# Patient Record
Sex: MALE | Race: OTHER | NOT HISPANIC OR LATINO | URBAN - METROPOLITAN AREA
[De-identification: names, ages, dates, MRNs, and addresses within clinical notes are randomized per-mention and may not be internally consistent; named-entity substitution may affect disease eponyms.]

---

## 2022-11-07 ENCOUNTER — EMERGENCY (EMERGENCY)
Facility: HOSPITAL | Age: 37
LOS: 1 days | Discharge: HOME | End: 2022-11-07
Attending: EMERGENCY MEDICINE
Payer: COMMERCIAL

## 2022-11-07 VITALS
WEIGHT: 300.05 LBS | HEART RATE: 85 BPM | SYSTOLIC BLOOD PRESSURE: 143 MMHG | HEIGHT: 75 IN | TEMPERATURE: 99 F | OXYGEN SATURATION: 97 % | RESPIRATION RATE: 18 BRPM | DIASTOLIC BLOOD PRESSURE: 83 MMHG

## 2022-11-07 VITALS
OXYGEN SATURATION: 100 % | RESPIRATION RATE: 18 BRPM | SYSTOLIC BLOOD PRESSURE: 134 MMHG | TEMPERATURE: 98 F | HEART RATE: 69 BPM | DIASTOLIC BLOOD PRESSURE: 80 MMHG

## 2022-11-07 LAB
ALBUMIN SERPL ELPH-MCNC: 3.9 G/DL — SIGNIFICANT CHANGE UP (ref 3.3–5.2)
ALP SERPL-CCNC: 66 U/L — SIGNIFICANT CHANGE UP (ref 40–120)
ALT FLD-CCNC: 16 U/L — SIGNIFICANT CHANGE UP
ANION GAP SERPL CALC-SCNC: 11 MMOL/L — SIGNIFICANT CHANGE UP (ref 5–17)
AST SERPL-CCNC: 25 U/L — SIGNIFICANT CHANGE UP
BASOPHILS # BLD AUTO: 0.05 K/UL — SIGNIFICANT CHANGE UP (ref 0–0.2)
BASOPHILS NFR BLD AUTO: 0.6 % — SIGNIFICANT CHANGE UP (ref 0–2)
BILIRUB SERPL-MCNC: 0.3 MG/DL — LOW (ref 0.4–2)
BUN SERPL-MCNC: 21.9 MG/DL — HIGH (ref 8–20)
CALCIUM SERPL-MCNC: 8.6 MG/DL — SIGNIFICANT CHANGE UP (ref 8.4–10.5)
CHLORIDE SERPL-SCNC: 103 MMOL/L — SIGNIFICANT CHANGE UP (ref 96–108)
CO2 SERPL-SCNC: 21 MMOL/L — LOW (ref 22–29)
CREAT SERPL-MCNC: 1.21 MG/DL — SIGNIFICANT CHANGE UP (ref 0.5–1.3)
D DIMER BLD IA.RAPID-MCNC: <150 NG/ML DDU — SIGNIFICANT CHANGE UP
EGFR: 79 ML/MIN/1.73M2 — SIGNIFICANT CHANGE UP
EOSINOPHIL # BLD AUTO: 0.44 K/UL — SIGNIFICANT CHANGE UP (ref 0–0.5)
EOSINOPHIL NFR BLD AUTO: 5.1 % — SIGNIFICANT CHANGE UP (ref 0–6)
GLUCOSE SERPL-MCNC: 99 MG/DL — SIGNIFICANT CHANGE UP (ref 70–99)
HCT VFR BLD CALC: 42.6 % — SIGNIFICANT CHANGE UP (ref 39–50)
HGB BLD-MCNC: 14.6 G/DL — SIGNIFICANT CHANGE UP (ref 13–17)
IMM GRANULOCYTES NFR BLD AUTO: 0.3 % — SIGNIFICANT CHANGE UP (ref 0–0.9)
LYMPHOCYTES # BLD AUTO: 2.35 K/UL — SIGNIFICANT CHANGE UP (ref 1–3.3)
LYMPHOCYTES # BLD AUTO: 27.1 % — SIGNIFICANT CHANGE UP (ref 13–44)
MCHC RBC-ENTMCNC: 30.2 PG — SIGNIFICANT CHANGE UP (ref 27–34)
MCHC RBC-ENTMCNC: 34.3 GM/DL — SIGNIFICANT CHANGE UP (ref 32–36)
MCV RBC AUTO: 88.2 FL — SIGNIFICANT CHANGE UP (ref 80–100)
MONOCYTES # BLD AUTO: 0.76 K/UL — SIGNIFICANT CHANGE UP (ref 0–0.9)
MONOCYTES NFR BLD AUTO: 8.8 % — SIGNIFICANT CHANGE UP (ref 2–14)
NEUTROPHILS # BLD AUTO: 5.04 K/UL — SIGNIFICANT CHANGE UP (ref 1.8–7.4)
NEUTROPHILS NFR BLD AUTO: 58.1 % — SIGNIFICANT CHANGE UP (ref 43–77)
NT-PROBNP SERPL-SCNC: <5 PG/ML — SIGNIFICANT CHANGE UP (ref 0–300)
PLATELET # BLD AUTO: 256 K/UL — SIGNIFICANT CHANGE UP (ref 150–400)
POTASSIUM SERPL-MCNC: 4.4 MMOL/L — SIGNIFICANT CHANGE UP (ref 3.5–5.3)
POTASSIUM SERPL-SCNC: 4.4 MMOL/L — SIGNIFICANT CHANGE UP (ref 3.5–5.3)
PROT SERPL-MCNC: 7.5 G/DL — SIGNIFICANT CHANGE UP (ref 6.6–8.7)
RAPID RVP RESULT: SIGNIFICANT CHANGE UP
RBC # BLD: 4.83 M/UL — SIGNIFICANT CHANGE UP (ref 4.2–5.8)
RBC # FLD: 12.2 % — SIGNIFICANT CHANGE UP (ref 10.3–14.5)
SARS-COV-2 RNA SPEC QL NAA+PROBE: SIGNIFICANT CHANGE UP
SODIUM SERPL-SCNC: 135 MMOL/L — SIGNIFICANT CHANGE UP (ref 135–145)
TROPONIN T SERPL-MCNC: <0.01 NG/ML — SIGNIFICANT CHANGE UP (ref 0–0.06)
WBC # BLD: 8.67 K/UL — SIGNIFICANT CHANGE UP (ref 3.8–10.5)
WBC # FLD AUTO: 8.67 K/UL — SIGNIFICANT CHANGE UP (ref 3.8–10.5)

## 2022-11-07 PROCEDURE — 93010 ELECTROCARDIOGRAM REPORT: CPT

## 2022-11-07 PROCEDURE — 71046 X-RAY EXAM CHEST 2 VIEWS: CPT | Mod: 26

## 2022-11-07 PROCEDURE — 85379 FIBRIN DEGRADATION QUANT: CPT

## 2022-11-07 PROCEDURE — 96361 HYDRATE IV INFUSION ADD-ON: CPT

## 2022-11-07 PROCEDURE — 99284 EMERGENCY DEPT VISIT MOD MDM: CPT | Mod: 25

## 2022-11-07 PROCEDURE — 85025 COMPLETE CBC W/AUTO DIFF WBC: CPT

## 2022-11-07 PROCEDURE — 94640 AIRWAY INHALATION TREATMENT: CPT

## 2022-11-07 PROCEDURE — 99285 EMERGENCY DEPT VISIT HI MDM: CPT

## 2022-11-07 PROCEDURE — 83880 ASSAY OF NATRIURETIC PEPTIDE: CPT

## 2022-11-07 PROCEDURE — 80053 COMPREHEN METABOLIC PANEL: CPT

## 2022-11-07 PROCEDURE — 0225U NFCT DS DNA&RNA 21 SARSCOV2: CPT

## 2022-11-07 PROCEDURE — 84484 ASSAY OF TROPONIN QUANT: CPT

## 2022-11-07 PROCEDURE — 36415 COLL VENOUS BLD VENIPUNCTURE: CPT

## 2022-11-07 PROCEDURE — 71046 X-RAY EXAM CHEST 2 VIEWS: CPT

## 2022-11-07 PROCEDURE — 96374 THER/PROPH/DIAG INJ IV PUSH: CPT

## 2022-11-07 PROCEDURE — 93005 ELECTROCARDIOGRAM TRACING: CPT

## 2022-11-07 RX ORDER — SODIUM CHLORIDE 9 MG/ML
1000 INJECTION INTRAMUSCULAR; INTRAVENOUS; SUBCUTANEOUS ONCE
Refills: 0 | Status: COMPLETED | OUTPATIENT
Start: 2022-11-07 | End: 2022-11-07

## 2022-11-07 RX ORDER — DEXAMETHASONE 0.5 MG/5ML
10 ELIXIR ORAL ONCE
Refills: 0 | Status: COMPLETED | OUTPATIENT
Start: 2022-11-07 | End: 2022-11-07

## 2022-11-07 RX ORDER — ALBUTEROL 90 UG/1
2 AEROSOL, METERED ORAL EVERY 6 HOURS
Refills: 0 | Status: DISCONTINUED | OUTPATIENT
Start: 2022-11-07 | End: 2022-11-09

## 2022-11-07 RX ADMIN — ALBUTEROL 2 PUFF(S): 90 AEROSOL, METERED ORAL at 11:01

## 2022-11-07 RX ADMIN — Medication 102 MILLIGRAM(S): at 11:01

## 2022-11-07 RX ADMIN — SODIUM CHLORIDE 1000 MILLILITER(S): 9 INJECTION INTRAMUSCULAR; INTRAVENOUS; SUBCUTANEOUS at 08:59

## 2022-11-07 RX ADMIN — SODIUM CHLORIDE 1000 MILLILITER(S): 9 INJECTION INTRAMUSCULAR; INTRAVENOUS; SUBCUTANEOUS at 11:01

## 2022-11-07 NOTE — ED PROVIDER NOTE - NS ED ROS FT
Gen: No fever, normal PO intake, no weight changes  Eyes: No eye irritation or discharge  ENT: No ear pain, no congestion, no rhinorrhea, no sore throat  Resp: No cough, +mild difficulty breathing  Cardiovascular: No chest pain, +palpitations  Gastrointestinal: No nausea, no vomiting, +diarrhea, no constipation  :  No change in urine output; no dysuria, no hematuria  MS: No joint or muscle pain  Skin: No rashes  Neuro: No headache; no abnormal movements  Remainder negative, except as per the HPI

## 2022-11-07 NOTE — ED PROVIDER NOTE - OBJECTIVE STATEMENT
36 year old male with PMHx HLD presenting for evaluation of palpitations. Reports 2-3 one minute episodes of palpitations at rest for past 1 week, self resolving. States became concerned this morning as palpitations have not resolved, started at 7:30AM, feel stronger than his past episodes, described as a feeling of his "heart about to jump out of his chest". Reports daily night sweats for past 1 week. Notes about 2 weeks of URI symptoms earlier this month, has been asymptomatic for 1 week. +1 episode of nonbloody diarrhea this morning, otherwise normal PO intake with normal BMs. Also reports a 5 hour car ride [place] [employer] 1 week ago. Denies any dyspnea during past 1 week but during examination patient does endorse feeling some shortness of breath. Denies any leg pain, pain in chest, numbness, weakness, headache, fever, cough, weight loss, abdominal pain.   Admits to smoking cigar about once per week, vaping (nicotine) daily, denies illicit drug use.

## 2022-11-07 NOTE — ED PROVIDER NOTE - NSFOLLOWUPINSTRUCTIONS_ED_ALL_ED_FT
Acute Bronchitis, Adult    A comparison between normal and swollen airways, or bronchi, in the lungs.   Acute bronchitis is sudden inflammation of the main airways (bronchi) that come off the windpipe (trachea) in the lungs. The swelling causes the airways to get smaller and make more mucus than normal. This can make it hard to breathe and can cause coughing or noisy breathing (wheezing).    Acute bronchitis may last several weeks. The cough may last longer. Allergies, asthma, and exposure to smoke may make the condition worse.      What are the causes?    This condition can be caused by germs and by substances that irritate the lungs, including:  •Cold and flu viruses. The most common cause of this condition is the virus that causes the common cold.      •Bacteria. This is less common.    •Breathing in substances that irritate the lungs, including:  •Smoke from cigarettes and other forms of tobacco.      •Dust and pollen.      •Fumes from household cleaning products, gases, or burned fuel.      •Indoor or outdoor air pollution.          What increases the risk?    The following factors may make you more likely to develop this condition:  •A weak body's defense system, also called the immune system.    •A condition that affects your lungs and breathing, such as asthma.      What are the signs or symptoms?    Common symptoms of this condition include:  •Coughing. This may bring up clear, yellow, or green mucus from your lungs (sputum).      •Wheezing.      •Runny or stuffy nose.      •Having too much mucus in your lungs (chest congestion).      •Shortness of breath.      •Aches and pains, including sore throat or chest.        How is this diagnosed?    This condition is usually diagnosed based on:  •Your symptoms and medical history.      •A physical exam.      You may also have other tests, including tests to rule out other conditions, such as pneumonia. These tests include:  •A test of lung function.      •Test of a mucus sample to look for the presence of bacteria.      •Tests to check the oxygen level in your blood.      •Blood tests.      •Chest X-ray.        How is this treated?    Most cases of acute bronchitis clear up over time without treatment. Your health care provider may recommend:  •Drinking more fluids to help thin your mucus so it is easier to cough up.      •Taking inhaled medicine (inhaler) to improve air flow in and out of your lungs.      •Using a vaporizer or a humidifier. These are machines that add water to the air to help you breathe better.      •Taking a medicine that thins mucus and clears congestion (expectorant).      •Taking a medicine that prevents or stops coughing (cough suppressant).      It is notcommon to take an antibiotic medicine for this condition.      Follow these instructions at home:  A do not smoke cigarettes sign.   •Take over-the-counter and prescription medicines only as told by your health care provider.      •Use an inhaler, vaporizer, or humidifier as told by your health care provider.      •Take two teaspoons (10 mL) of honey at bedtime to lessen coughing at night.      •Drink enough fluid to keep your urine pale yellow.      • Do not use any products that contain nicotine or tobacco. These products include cigarettes, chewing tobacco, and vaping devices, such as e-cigarettes. If you need help quitting, ask your health care provider.      •Get plenty of rest.      •Return to your normal activities as told by your health care provider. Ask your health care provider what activities are safe for you.      •Keep all follow-up visits. This is important.        How is this prevented?  Washing hands with soap and water.   To lower your risk of getting this condition again:  •Wash your hands often with soap and water for at least 20 seconds. If soap and water are not available, use hand .    •Avoid contact with people who have cold symptoms.    •Try not to touch your mouth, nose, or eyes with your hands.    •Avoid breathing in smoke or chemical fumes. Breathing smoke or chemical fumes will make your condition worse.      •Get the flu shot every year.        Contact a health care provider if:    •Your symptoms do not improve after 2 weeks.    •You have trouble coughing up the mucus.    •Your cough keeps you awake at night.    •You have a fever.        Get help right away if you:    •Cough up blood.    •Feel pain in your chest.    •Have severe shortness of breath.    •Faint or keep feeling like you are going to faint.      •Have a severe headache.      •Have a fever or chills that get worse.      These symptoms may represent a serious problem that is an emergency. Do not wait to see if the symptoms will go away. Get medical help right away. Call your local emergency services (911 in the [place] Do not drive yourself to the hospital.       Summary    •Acute bronchitis is inflammation of the main airways (bronchi) that come off the windpipe (trachea) in the lungs. The swelling causes the airways to get smaller and make more mucus than normal.    •Drinking more fluids can help thin your mucus so it is easier to cough up.    •Take over-the-counter and prescription medicines only as told by your health care provider.    • Do not use any products that contain nicotine or tobacco. These products include cigarettes, chewing tobacco, and vaping devices, such as e-cigarettes. If you need help quitting, ask your health care provider.      •Contact a health care provider if your symptoms do not improve after 2 weeks.

## 2022-11-07 NOTE — ED PROVIDER NOTE - NSFOLLOWUPCLINICS_GEN_ALL_ED_FT
A Cardiologist  Cardiology  .  [place]   Phone:   Fax:     [facility]  Cardiology  [address] [place] [address]  Phone: [phone]  Fax:     [facility]  Cardiology  [address] [facility] [address] [facility] [address] [facility], [facility] [address]  [place] [address]  Phone: [phone]  Fax:

## 2022-11-07 NOTE — ED ADULT NURSE NOTE - OBJECTIVE STATEMENT
Patient presents to ER C/O palpitations for the past week, denies CP, no SOB, no sick contacts/recent travel, resp even/unlabored.

## 2022-11-07 NOTE — ED PROVIDER NOTE - ATTENDING CONTRIBUTION TO CARE
negative... Yuri PRATT- 35 Y/O M p/w palpitations off& on for 1 week but no chest pain, syncope, nausea, vomiting. Pt is not smoker and had recent uri and took mucinex until last week, pt has mild residual cough. No fhx of cad , no h/o asthma    Pt is alert, well appearing male, s1s2 normal reg, b/l clear breath sounds, abd soft, nt, nd, neuro exam aox3, cn 2-12 intact, no focal deficits, skin warm, dry, good turgor    plan to check labs r/o acs,  electrolyte imbalance and if all neg , plan for outpatient f/u    cxr showed  bronchitis changes, will treat with decadron, albuterol and discharge home

## 2022-11-07 NOTE — ED PROVIDER NOTE - PHYSICAL EXAMINATION
Gen: well appearing, no acute distress  Head: normocephalic, atraumatic  EENT: EOMI, PERRLA, neck supple, moist mucous membranes, no scleral icterus  Lung: no increased work of breathing, clear to auscultation bilaterally, no wheezing, rales, rhonchi, speaking in full sentences  CV: regular rate, regular rhythm, normal s1/s2, 2+ radial pulses bilaterally  Abd: soft, non-tender, non-distended, no rebound tenderness or guarding; no CVA tenderness  MSK: No edema, no visible deformities, full range of motion in all 4 extremities  Neuro: Awake, alert, no focal neurologic deficits  Skin: No rash, no lesions, no jaundice  Psych: normal affect, normal speech

## 2022-11-07 NOTE — ED ADULT TRIAGE NOTE - CHIEF COMPLAINT QUOTE
c/o chest pain that started intermittently last week and today became constant and heavy pressure with palpitation also c/o night sweats

## 2022-11-07 NOTE — ED PROVIDER NOTE - CLINICAL SUMMARY MEDICAL DECISION MAKING FREE TEXT BOX
36 year old male with 1 week of palpitations and night sweats. Risk factors of nicotine use, recent long car ride, recent URI. EKG done at triage normal sinus rhythm at 79bpm. Will check blood work, give IVF, reassess.

## 2022-11-07 NOTE — ED PROVIDER NOTE - PROGRESS NOTE DETAILS
Cardiac work up, d-dimer, basic labs reviewed, unremarkable, do not require emergent intervention. Chest x-ray without focal consolidation, notable for peribronchial cuffing, consistent with bronchitis. Treated with albuterol and decadron with improvement of symptoms.     Reviewed negative work up results with patient. Patient reports feeling much improved. Comfortable with plan for discharge. Agrees to follow up with primary care provider/cardiology. Return instructions given, questions answered.

## 2023-04-24 NOTE — ED PROVIDER NOTE - NSTIMEPROVIDERCAREINITIATE_GEN_ER
Weight Units: pounds Pounds Preamble Statement (Weight Entered In Details Tab): Reported Weight in pounds: Hypertriglyceridemia Monitoring: I explained this is common when taking isotretinoin. If this worsens they will contact us. Cheilitis Aggressive Treatment: I recommended application of Vaseline or Aquaphor numerous times a day (as often as every hour) and before going to bed. I also prescribed a topical steroid for twice daily use. Hypertriglyceridemia Treatment: I explained this is common when taking isotretinoin. If this worsens they will contact us. They may try OTC ibuprofen. 07-Nov-2022 08:18 Xerosis Normal Treatment: I recommended application of Cetaphil or CeraVe numerous times a day and before going to bed to all dry areas. Any Myalgia?: No Next Month's Dosage: Continue Current Dosage Kilograms Preamble Statement (Weight Entered In Details Tab): Reported Weight in kilograms: Headache Monitoring: I recommended monitoring the headaches for now. There is no evidence of increased intracranial pressure. They were instructed to call if the headaches are worsening. Retinoid Dermatitis Normal Treatment: I recommended more frequent application of Cetaphil or CeraVe to the areas of dermatitis. Months Of Therapy Completed: 1 Xerosis Aggressive Treatment: I recommended application of Cetaphil or CeraVe numerous times a day and before going to bed to all dry areas. I also prescribed a topical steroid for twice daily use. Lower Range (In Mg/Kg): 120 Female Completion Statement: After discussing her treatment course we decided to discontinue isotretinoin therapy at this time. I explained that she would need to continue her birth control methods for at least one month after the last dosage. She should also get a pregnancy test one month after the last dose. She shouldn't donate blood for one month after the last dose. She should call with any new symptoms of depression. Upper Range (In Mg/Kg): 150 Any Nosebleeds?: Yes - Normal Treatment Male Completion Statement: After discussing his treatment course we decided to discontinue isotretinoin therapy at this time. He shouldn't donate blood for one month after the last dose. He should call with any new symptoms of depression. Retinoid Dermatitis Aggressive Treatment: I recommended more frequent application of Cetaphil or CeraVe to the areas of dermatitis. I also prescribed a topical steroid for twice daily use until the dermatitis resolves. Cheilitis Normal Treatment: I recommended application of Vaseline or Aquaphor numerous times a day (as often as every hour) and before going to bed. Hypercholesterolemia Monitoring: I explained this is common when taking isotretinoin. We will monitor closely. Target Cumulative Dosage (In Mg/Kg): 135 Dosing Month 1 (Required For Cumulative Dosing): 40mg Daily Add Associated Diagnosis When Managing Medication Side Effects: Yes Detail Level: Zone Nosebleeds Normal Treatment: I explained this is common when taking isotretinoin. I recommended saline mist in each nostril multiple times a day. If this worsens they will contact us. Xerosis Normal Treatment: I recommended application of Cetaphil or CeraVe numerous times a day going to bed to all dry areas. What Is The Patient's Gender: Male Xerosis Aggressive Treatment: I recommended application of Cetaphil or CeraVe numerous times a day going to bed to all dry areas. I also prescribed a topical steroid for twice daily use. Counseling Text: I reviewed the side effect in detail. Patient should get monthly blood tests, not donate blood, not drive at night if vision affected, and not share medication. Female Pregnancy Counseling Text: Female patients should also be on two forms of birth control while taking this medication and for one month after their last dose. Patient Weight (Optional But Required For Cumulative Dose-Numbers And Decimals Only): 175 Use Therapeutic Ranged Or Therapeutic Target: please select Range or Target

## 2024-04-04 NOTE — ED PROVIDER NOTE - NS ED ATTENDING STATEMENT MOD
Pt refused to have bed alarm on, RN educated pt on importance of having alarm on to promote safety and prevent falls, Pt still refused, call light placed within reach.   Attending with